# Patient Record
Sex: FEMALE | Race: WHITE | NOT HISPANIC OR LATINO | URBAN - METROPOLITAN AREA
[De-identification: names, ages, dates, MRNs, and addresses within clinical notes are randomized per-mention and may not be internally consistent; named-entity substitution may affect disease eponyms.]

---

## 2022-04-20 ENCOUNTER — APPOINTMENT (OUTPATIENT)
Dept: PREADMISSION TESTING | Facility: HOSPITAL | Age: 82
End: 2022-04-20

## 2022-09-14 ENCOUNTER — DOCUMENTATION (OUTPATIENT)
Dept: FAMILY MEDICINE CLINIC | Facility: CLINIC | Age: 82
End: 2022-09-14

## 2022-09-15 ENCOUNTER — NURSING HOME (OUTPATIENT)
Dept: INTERNAL MEDICINE | Facility: CLINIC | Age: 82
End: 2022-09-15

## 2022-09-15 VITALS
HEART RATE: 74 BPM | RESPIRATION RATE: 18 BRPM | TEMPERATURE: 100.9 F | DIASTOLIC BLOOD PRESSURE: 72 MMHG | SYSTOLIC BLOOD PRESSURE: 139 MMHG | OXYGEN SATURATION: 93 % | WEIGHT: 143 LBS

## 2022-09-15 DIAGNOSIS — M79.7 FIBROMYALGIA: ICD-10-CM

## 2022-09-15 DIAGNOSIS — I48.20 CHRONIC ATRIAL FIBRILLATION, UNSPECIFIED: ICD-10-CM

## 2022-09-15 DIAGNOSIS — F33.8 OTHER RECURRENT DEPRESSIVE DISORDERS: ICD-10-CM

## 2022-09-15 DIAGNOSIS — IMO0002 SQUAMOUS CELL CARCINOMA, METASTATIC: Primary | ICD-10-CM

## 2022-09-15 DIAGNOSIS — J41.1 MUCOPURULENT CHRONIC BRONCHITIS: ICD-10-CM

## 2022-09-15 DIAGNOSIS — G47.33 OBSTRUCTIVE SLEEP APNEA: ICD-10-CM

## 2022-09-15 DIAGNOSIS — I50.42 CHRONIC COMBINED SYSTOLIC AND DIASTOLIC CHF (CONGESTIVE HEART FAILURE): ICD-10-CM

## 2022-09-15 PROBLEM — A41.9 SEPSIS, UNSPECIFIED ORGANISM: Status: ACTIVE | Noted: 2022-09-15

## 2022-09-15 PROBLEM — A41.9 SEPSIS: Status: ACTIVE | Noted: 2022-09-15

## 2022-09-15 PROBLEM — J44.9 CHRONIC OBSTRUCTIVE PULMONARY DISEASE, UNSPECIFIED: Status: ACTIVE | Noted: 2022-09-15

## 2022-09-15 PROBLEM — E56.9 VITAMIN DEFICIENCY, UNSPECIFIED: Status: ACTIVE | Noted: 2022-09-15

## 2022-09-15 PROBLEM — K21.9 GERD WITHOUT ESOPHAGITIS: Status: ACTIVE | Noted: 2022-09-15

## 2022-09-15 PROBLEM — A41.9 SEPSIS: Status: RESOLVED | Noted: 2022-09-15 | Resolved: 2022-09-15

## 2022-09-15 PROBLEM — E78.5 HYPERLIPIDEMIA: Status: ACTIVE | Noted: 2022-09-15

## 2022-09-15 PROBLEM — R42 DIZZINESS AND GIDDINESS: Status: ACTIVE | Noted: 2022-09-15

## 2022-09-15 PROBLEM — K59.00 CONSTIPATION, UNSPECIFIED: Status: ACTIVE | Noted: 2022-09-15

## 2022-09-15 PROBLEM — F06.4 ANXIETY DISORDER DUE TO KNOWN PHYSIOLOGICAL CONDITION: Status: ACTIVE | Noted: 2022-09-15

## 2022-09-15 PROBLEM — I52: Status: ACTIVE | Noted: 2022-09-15

## 2022-09-15 PROBLEM — H04.123 DRY EYE SYNDROME OF BILATERAL LACRIMAL GLANDS: Status: ACTIVE | Noted: 2022-09-15

## 2022-09-15 PROBLEM — J30.2 OTHER SEASONAL ALLERGIC RHINITIS: Status: ACTIVE | Noted: 2022-09-15

## 2022-09-15 PROBLEM — G89.29 OTHER CHRONIC PAIN: Status: ACTIVE | Noted: 2022-09-15

## 2022-09-15 PROCEDURE — 99306 1ST NF CARE HIGH MDM 50: CPT | Performed by: INTERNAL MEDICINE

## 2022-09-15 RX ORDER — OXYCODONE HYDROCHLORIDE 15 MG/1
15 TABLET, FILM COATED, EXTENDED RELEASE ORAL EVERY 6 HOURS PRN
COMMUNITY
End: 2022-09-16

## 2022-09-15 RX ORDER — GABAPENTIN 400 MG/1
800 CAPSULE ORAL 3 TIMES DAILY
COMMUNITY
End: 2022-09-22 | Stop reason: SDUPTHER

## 2022-09-16 RX ORDER — OXYCODONE HYDROCHLORIDE 15 MG/1
15 TABLET ORAL EVERY 6 HOURS PRN
Qty: 60 TABLET | Refills: 0 | Status: SHIPPED | OUTPATIENT
Start: 2022-09-16 | End: 2022-09-23 | Stop reason: SDUPTHER

## 2022-09-16 RX ORDER — OXYCODONE HYDROCHLORIDE 15 MG/1
15 TABLET ORAL EVERY 6 HOURS PRN
COMMUNITY
End: 2022-09-16 | Stop reason: SDUPTHER

## 2022-09-22 RX ORDER — GABAPENTIN 400 MG/1
800 CAPSULE ORAL 3 TIMES DAILY
Qty: 120 CAPSULE | Refills: 1 | Status: SHIPPED | OUTPATIENT
Start: 2022-09-22 | End: 2022-10-05 | Stop reason: SDUPTHER

## 2022-09-22 NOTE — TELEPHONE ENCOUNTER
[facility] REQUESTING MED REFILL FOR GABAPENTIN 800 MG.    DIRECTIONS: GABAPENTIN 800 MG 1 PO TID SCHEDULED.

## 2022-09-23 RX ORDER — OXYCODONE HYDROCHLORIDE 15 MG/1
15 TABLET ORAL EVERY 6 HOURS
Qty: 60 TABLET | Refills: 0 | Status: SHIPPED | OUTPATIENT
Start: 2022-09-23 | End: 2022-10-05 | Stop reason: SDUPTHER

## 2022-09-23 NOTE — TELEPHONE ENCOUNTER
(NEW SCRIPT)    [facility] REQUESTING OXYCODONE 15 MG.    DIRECTIONS: OXYCODONE 15 MG 1 PO Q 6 HRS SCHEDULED.

## 2022-10-05 RX ORDER — GABAPENTIN 400 MG/1
800 CAPSULE ORAL 3 TIMES DAILY
Qty: 40 CAPSULE | Refills: 0 | Status: SHIPPED | OUTPATIENT
Start: 2022-10-05 | End: 2022-11-18 | Stop reason: SDUPTHER

## 2022-10-05 RX ORDER — OXYCODONE HYDROCHLORIDE 15 MG/1
15 TABLET ORAL EVERY 6 HOURS
Qty: 20 TABLET | Refills: 0 | Status: SHIPPED | OUTPATIENT
Start: 2022-10-05 | End: 2022-11-18 | Stop reason: SDUPTHER

## 2022-10-05 NOTE — TELEPHONE ENCOUNTER
(DISCHARGE SCRIPT)    [facility] REQUESTING MED REFILL FOR OXYCODONE 15 MG AND GABAPENTIN 800 MG.    DIRECTIONS: OXYCODONE 15 MG 1 PO Q 6 HRS SCHEDULED.    GABAPENTIN 800 MG 1 PO TID SCHEDULED.

## 2022-11-17 ENCOUNTER — DOCUMENTATION (OUTPATIENT)
Dept: FAMILY MEDICINE CLINIC | Facility: CLINIC | Age: 82
End: 2022-11-17

## 2022-11-17 ENCOUNTER — NURSING HOME (OUTPATIENT)
Dept: INTERNAL MEDICINE | Facility: CLINIC | Age: 82
End: 2022-11-17

## 2022-11-17 VITALS
SYSTOLIC BLOOD PRESSURE: 118 MMHG | RESPIRATION RATE: 16 BRPM | TEMPERATURE: 96.9 F | DIASTOLIC BLOOD PRESSURE: 68 MMHG | HEART RATE: 70 BPM | WEIGHT: 140 LBS | OXYGEN SATURATION: 96 %

## 2022-11-17 DIAGNOSIS — K21.9 GASTROESOPHAGEAL REFLUX DISEASE WITHOUT ESOPHAGITIS: ICD-10-CM

## 2022-11-17 DIAGNOSIS — I50.42 CHRONIC COMBINED SYSTOLIC AND DIASTOLIC CHF (CONGESTIVE HEART FAILURE): ICD-10-CM

## 2022-11-17 DIAGNOSIS — M79.7 FIBROMYALGIA: ICD-10-CM

## 2022-11-17 DIAGNOSIS — I48.20 CHRONIC ATRIAL FIBRILLATION, UNSPECIFIED: ICD-10-CM

## 2022-11-17 DIAGNOSIS — J41.1 MUCOPURULENT CHRONIC BRONCHITIS: Primary | ICD-10-CM

## 2022-11-17 DIAGNOSIS — F33.8 OTHER RECURRENT DEPRESSIVE DISORDERS: ICD-10-CM

## 2022-11-17 DIAGNOSIS — G47.33 OBSTRUCTIVE SLEEP APNEA: ICD-10-CM

## 2022-11-17 PROCEDURE — 99305 1ST NF CARE MODERATE MDM 35: CPT | Performed by: INTERNAL MEDICINE

## 2022-11-17 RX ORDER — MORPHINE SULFATE 30 MG/1
30 TABLET ORAL 3 TIMES DAILY
COMMUNITY
End: 2022-12-14 | Stop reason: SDUPTHER

## 2022-11-17 RX ORDER — MORPHINE SULFATE 30 MG/1
30 TABLET, FILM COATED, EXTENDED RELEASE ORAL 2 TIMES DAILY
COMMUNITY
End: 2022-11-18 | Stop reason: SDUPTHER

## 2022-11-18 RX ORDER — MORPHINE SULFATE 30 MG/1
30 TABLET, FILM COATED, EXTENDED RELEASE ORAL 2 TIMES DAILY
Qty: 40 TABLET | Refills: 0 | Status: SHIPPED | OUTPATIENT
Start: 2022-11-18 | End: 2022-12-01 | Stop reason: SDUPTHER

## 2022-11-18 RX ORDER — GABAPENTIN 400 MG/1
800 CAPSULE ORAL 3 TIMES DAILY
Qty: 90 CAPSULE | Refills: 1 | Status: SHIPPED | OUTPATIENT
Start: 2022-11-18 | End: 2022-12-05 | Stop reason: SDUPTHER

## 2022-11-18 RX ORDER — OXYCODONE HYDROCHLORIDE 15 MG/1
15 TABLET ORAL EVERY 4 HOURS PRN
Qty: 40 TABLET | Refills: 0 | Status: SHIPPED | OUTPATIENT
Start: 2022-11-18 | End: 2022-12-02 | Stop reason: SDUPTHER

## 2022-11-18 RX ORDER — ALPRAZOLAM 0.25 MG/1
0.25 TABLET ORAL NIGHTLY PRN
Qty: 20 TABLET | Refills: 1 | Status: SHIPPED | OUTPATIENT
Start: 2022-11-18 | End: 2022-12-14 | Stop reason: SDUPTHER

## 2022-11-18 NOTE — TELEPHONE ENCOUNTER
[facility] REQUESTING MED REFILL FOR ALPRAZOLAM 0.25 MG, GABAPENTIN 800 MG AND OXYCODONE 15 MG.    DIRECTIONS: ALPRAZOLAM 0.25 MG 1 PO Q HS PRN.    GABAPENTIN 400 MG (2 CAPS =800 MG) TID.    OXYCODONE 15 MG 1 PO Q 4 HRS PRN.

## 2022-11-26 NOTE — PROGRESS NOTES
Nursing Home History and Physical       Justus Cosme DO  [address] [place] [address] Phone: [phone]  Fax: [phone]     PATIENT NAME: Shanita Montanez                                                                          YOB: 1940           DATE OF SERVICE: 11/17/2022  FACILITY:  [facility]    CHIEF COMPLAINT:  Nursing facility admission      HISTORY OF PRESENT ILLNESS:   Patient is an 81 yo W F with copd, chronic pain, and metastatic lung cancer (to bone and hips) who recently was hospitalized at [facility] for UTI and left hip pain.  Orthopedics was consulted and left hip replacement revision surgery was performed.  Patient has chronic pain and has been on long term high dose narcotic regimen.  Patient was transferred to this facility for rehab postoperatively.      On exam today, patient was complaining of persistent pain and not receiving her medications on time.  She was waiting for her narcotics to come through from the pharmacy.  Family in room also expressed frustration with not obtaining pain medications.  COPD is stable.  Appetite has been reasonable.  PT has been limited due to pain control.       PAST MEDICAL & SURGICAL HISTORY:   Past Medical History:   Diagnosis Date   • Allergic    • Anxiety    • Cancer (HCC)    • COPD (chronic obstructive pulmonary disease) (HCC)    • Dysphagia    • Fibromyalgia, primary    • GERD (gastroesophageal reflux disease)    • Hyperlipidemia    • Obstructive sleep apnea    • Restless leg syndrome    • Squamous cell cancer with metastases       Past Surgical History:   Procedure Laterality Date   • JOINT REPLACEMENT     • ORIF FEMUR FRACTURE Left          MEDICATIONS:  I have reviewed and reconciled the patients medication list in the patients chart at the skilled nursing facility on 11/17/2022.      ALLERGIES:  Allergies   Allergen Reactions   • Cephalexin Unknown - High Severity   • Cephalosporins Unknown - High Severity          SOCIAL HISTORY:  Social History     Socioeconomic History   • Marital status: Unknown       FAMILY HISTORY:  No family history on file.     REVIEW OF SYSTEMS:  Review of Systems   Constitutional: Negative for chills, fatigue and fever.   HENT: Negative for congestion, ear pain, rhinorrhea, sinus pressure and sore throat.    Eyes: Negative for visual disturbance.   Respiratory: Negative for cough, chest tightness, shortness of breath and wheezing.    Cardiovascular: Negative for chest pain, palpitations and leg swelling.   Gastrointestinal: Negative for abdominal pain, blood in stool, constipation, diarrhea, nausea and vomiting.   Endocrine: Negative for polydipsia and polyuria.   Genitourinary: Negative for dysuria and hematuria.   Musculoskeletal: Positive for arthralgias and back pain.   Skin: Negative for rash.   Neurological: Negative for dizziness, light-headedness, numbness and headaches.   Psychiatric/Behavioral: Positive for dysphoric mood. Negative for sleep disturbance. The patient is not nervous/anxious.          PHYSICAL EXAMINATION:   VITAL SIGNS: /68   Pulse 70   Temp 96.9 °F (36.1 °C)   Resp 16   Wt 63.5 kg (140 lb)   SpO2 96%     Physical Exam  Vitals and nursing note reviewed.   Constitutional:       Appearance: Normal appearance. She is well-developed. She is not ill-appearing.      Comments: Frail bedridden elderly female, NAD   HENT:      Head: Normocephalic and atraumatic.      Nose: Nose normal.      Mouth/Throat:      Mouth: Mucous membranes are moist.      Pharynx: No oropharyngeal exudate.   Eyes:      General: No scleral icterus.     Conjunctiva/sclera: Conjunctivae normal.      Pupils: Pupils are equal, round, and reactive to light.   Neck:      Thyroid: No thyromegaly.   Cardiovascular:      Rate and Rhythm: Normal rate and regular rhythm.      Heart sounds: Normal heart sounds. No murmur heard.    No friction rub. No gallop.   Pulmonary:      Effort: Pulmonary effort is  normal. No respiratory distress.      Breath sounds: Normal breath sounds. No wheezing.   Abdominal:      General: Bowel sounds are normal. There is no distension.      Palpations: Abdomen is soft.      Tenderness: There is no abdominal tenderness.   Musculoskeletal:         General: No deformity or signs of injury.      Cervical back: Normal range of motion and neck supple.   Lymphadenopathy:      Cervical: No cervical adenopathy.   Skin:     General: Skin is warm and dry.      Findings: No rash.   Neurological:      Mental Status: She is alert and oriented to person, place, and time.   Psychiatric:         Mood and Affect: Mood normal.         Behavior: Behavior normal.         RECORDS REVIEW:   Discharge Summary from [facility] 11/16/22    ASSESSMENT   Diagnoses and all orders for this visit:    1. Mucopurulent chronic bronchitis (HCC) (Primary)    2. Other recurrent depressive disorders (HCC)    3. Chronic atrial fibrillation, unspecified (HCC)    4. Chronic combined systolic and diastolic CHF (congestive heart failure) (HCC)    5. Obstructive sleep apnea    6. Fibromyalgia    7. Gastroesophageal reflux disease without esophagitis        PLAN  Left ORIF / Hip fracture  - cont current narcotic regimen with long acting morphine and oxycodone.  Unfortunately scripts did not reach pharmacy in a timely manner and missed doses have occurred as a result.    - Patient and family seem very focused on obtaining pain medications.      Hernia  - conservative management per hospital    Anxiety / Depression   - stable on current medications.      Chronic pain with SCC Ca with Lung mets to hip  - oncology following, on high dose narcotics for chronic pain management.     COPD  - stable at this time.      GERD  - controlled with current medications.         [x]  Discussed Patient in detail with nursing/staff, addressed all needs today.     [x]  Plan of Care Reviewed   [x]  PT/OT Reviewed   []  Order Changes  []   Discharge Plans Reviewed  [x]  Advance Directive on file with Nursing Home.   [x]  POA on file with Nursing Home.    [x]  Code Status listed and reviewed.        Justus Cosme DO.  11/27/2022      **Part of this note may be an electronic transcription/translation of spoken language to printed text using the Dragon Dictation System.**

## 2022-12-01 RX ORDER — MORPHINE SULFATE 30 MG/1
30 TABLET, FILM COATED, EXTENDED RELEASE ORAL EVERY 8 HOURS SCHEDULED
Qty: 90 TABLET | Refills: 0 | Status: SHIPPED | OUTPATIENT
Start: 2022-12-01

## 2022-12-01 NOTE — TELEPHONE ENCOUNTER
(NEW SCRIPT)    [facility] REQUESTING MED REFILL FOR MORPHINE SULFATE 30 MG.    DIRECTIONS: MORPHINE SULFATE 30 MG 1 PO Q 8 HRS SCHEDULED.

## 2022-12-02 RX ORDER — OXYCODONE HYDROCHLORIDE 15 MG/1
15 TABLET ORAL EVERY 4 HOURS PRN
Qty: 90 TABLET | Refills: 0 | Status: SHIPPED | OUTPATIENT
Start: 2022-12-02 | End: 2022-12-14 | Stop reason: SDUPTHER

## 2022-12-05 RX ORDER — GABAPENTIN 400 MG/1
800 CAPSULE ORAL 3 TIMES DAILY
Qty: 90 CAPSULE | Refills: 1 | Status: SHIPPED | OUTPATIENT
Start: 2022-12-05 | End: 2022-12-14 | Stop reason: SDUPTHER

## 2022-12-05 NOTE — TELEPHONE ENCOUNTER
[facility] REQUESTING MED REFILL FOR GABAPENTIN 400 MG.    DIRECTIONS: GABAPENTIN 400 MG, TAKE 2 CAPS PO TID.

## 2022-12-14 RX ORDER — GABAPENTIN 400 MG/1
800 CAPSULE ORAL 3 TIMES DAILY
Qty: 40 CAPSULE | Refills: 0 | Status: SHIPPED | OUTPATIENT
Start: 2022-12-14

## 2022-12-14 RX ORDER — ALPRAZOLAM 0.25 MG/1
0.25 TABLET ORAL NIGHTLY PRN
Qty: 20 TABLET | Refills: 0 | Status: SHIPPED | OUTPATIENT
Start: 2022-12-14

## 2022-12-14 RX ORDER — MORPHINE SULFATE 30 MG/1
30 TABLET ORAL 3 TIMES DAILY
Qty: 20 TABLET | Refills: 0 | Status: SHIPPED | OUTPATIENT
Start: 2022-12-14

## 2022-12-14 RX ORDER — OXYCODONE HYDROCHLORIDE 15 MG/1
15 TABLET ORAL EVERY 4 HOURS PRN
Qty: 20 TABLET | Refills: 0 | Status: SHIPPED | OUTPATIENT
Start: 2022-12-14

## 2022-12-14 NOTE — TELEPHONE ENCOUNTER
(DISCHARGE SCRIPTS)    [facility] REQUESTING MED REFILLS FOR XANAX 0.25 MG, MORPHINE 30 MG, GABAPENTIN 400 MG, AND OXYCODONE 15 MG.    DIRECTIONS: XANAX 0.25 MG 1 PO QD PRN.    MORPHINE 30 MG 1 PO Q 8 HRS SCHEDULED.       GABAPENTIN 400 MG, GIVE 2 CAPS =  MG PO TID.    OXYCODONE 15 MG 1 PO Q 4 HRS PRN.